# Patient Record
Sex: FEMALE | Race: WHITE | NOT HISPANIC OR LATINO | Employment: FULL TIME | URBAN - METROPOLITAN AREA
[De-identification: names, ages, dates, MRNs, and addresses within clinical notes are randomized per-mention and may not be internally consistent; named-entity substitution may affect disease eponyms.]

---

## 2017-08-08 ENCOUNTER — ALLSCRIPTS OFFICE VISIT (OUTPATIENT)
Dept: OTHER | Facility: OTHER | Age: 27
End: 2017-08-08

## 2017-08-15 ENCOUNTER — ALLSCRIPTS OFFICE VISIT (OUTPATIENT)
Dept: OTHER | Facility: OTHER | Age: 27
End: 2017-08-15

## 2017-08-15 LAB
BILIRUB UR QL STRIP: NORMAL
CLARITY UR: NORMAL
COLOR UR: YELLOW
GLUCOSE (HISTORICAL): NORMAL
HGB UR QL STRIP.AUTO: NORMAL
KETONES UR STRIP-MCNC: NORMAL MG/DL
LEUKOCYTE ESTERASE UR QL STRIP: 500
NITRITE UR QL STRIP: NORMAL
PH UR STRIP.AUTO: 7 [PH]
PROT UR STRIP-MCNC: NORMAL MG/DL
SP GR UR STRIP.AUTO: 1
UROBILINOGEN UR QL STRIP.AUTO: NORMAL

## 2017-08-17 LAB
CULTURE RESULT (HISTORICAL): ABNORMAL
MISCELLANEOUS LAB TEST RESULT (HISTORICAL): ABNORMAL
SUSCEP. REFLEX (HISTORICAL): ABNORMAL

## 2018-01-13 NOTE — PROGRESS NOTES
Assessment    1  Encounter for preventive health examination (V70 0) (Z00 00)    Plan  Fear of flying    · LORazepam 0 5 MG Oral Tablet; TAKE ONE TABLET BY MOUTH EVERY 8  HOURS AS NEEDED  Health Maintenance    · (1) COMPREHENSIVE METABOLIC PANEL; Status:Active; Requested for:[id number];    · Follow-up visit in 1 year Evaluation and Treatment  Follow-up  Status: Hold For -  Scheduling  Requested for: 25QIH1489   · Always use a seat belt and shoulder strap when riding or driving a motor vehicle ;  Status:Complete;   Done: 26QQP2778 09:01AM   · Use a sun block product with an SPF of 15 or more ; Status:Complete;   Done:  56VUD6543 09:01AM   · We recommend that you follow the "Mediterranean diet "; Status:Complete;   Done:  76UUJ4036 09:01AM  Health Maintenance, Paresthesia of both feet    · (1) CBC/PLT/DIFF; Status:Active; Requested for:93Qwj9432;    · (1) TSH; Status:Active; Requested for:41Zdy5085;   Screening for hyperlipidemia    · (1) LIPID PANEL, FASTING; Status:Active; Requested for:78Fhq2895;     Discussion/Summary  health maintenance visit Currently, she eats a healthy diet  cervical cancer screening is needed every three years Breast cancer screening: breast cancer screening is not indicated  Colorectal cancer screening: colorectal cancer screening is not indicated  Osteoporosis screening: bone mineral density testing is not indicated  Screening lab work includes hemoglobin, glucose, lipid profile and thyroid function testing  The immunizations are up to date  Advice and education were given regarding aerobic exercise, sunscreen use and seat belt use  Chief Complaint  Patient is here today for her annual physical exam, L  Whittaker/LPN      History of Present Illness  HM, Adult Female: The patient is being seen for a health maintenance evaluation  The last health maintenance visit was 12 month(s) ago  Social History: Household members include spouse  She is    Work status: working full time and occupation: Teacher  The patient has never smoked cigarettes  She reports rare alcohol use  She has never used illicit drugs  General Health: The patient's health since the last visit is described as good  She has regular dental visits  She denies vision problems  She denies hearing loss  Immunizations status: up to date  Lifestyle:  She consumes a diverse and healthy diet  She does not have any weight concerns  She exercises regularly  She does not use tobacco  She denies drug use  Reproductive health: the patient is premenopausal    Screening: Cervical cancer screening includes a pap smear performed last year  Breast cancer screening includes no previous mammogram  She hasn't been previously screened for colorectal cancer  Metabolic screening includes lipid profile performed within the past five years, glucose screening performed last year and thyroid function test performed last year  Cardiovascular risk factors: no hypertension, no diabetes, no high LDL cholesterol, no low HDL cholesterol, no stress, no obesity, no tobacco use, no illicit drug use and no sedentary lifestyle  General health risks: no previous breast cancer, no previous colon polyps, no inflammatory bowel disease, no osteoporosis risk factors, no asbestos exposure and no radiation exposure  Safety elements used: seat belt, smoke detector and carbon monoxide detector  Review of Systems    Constitutional: No fever, no chills, feels well, no tiredness, no recent weight gain or weight loss  Eyes: No complaints of eye pain, no red eyes, no eyesight problems, no discharge, no dry eyes, no itching of eyes  ENT: no complaints of earache, no loss of hearing, no nose bleeds, no nasal discharge, no sore throat, no hoarseness  Cardiovascular: No complaints of slow heart rate, no fast heart rate, no chest pain, no palpitations, no leg claudication, no lower extremity edema     Respiratory: No complaints of shortness of breath, no wheezing, no cough, no SOB on exertion, no orthopnea, no PND  Gastrointestinal: No complaints of abdominal pain, no constipation, no nausea or vomiting, no diarrhea, no bloody stools  Genitourinary: No complaints of dysuria, no incontinence, no pelvic pain, no dysmenorrhea, no vaginal discharge or bleeding  Musculoskeletal: No complaints of arthralgias, no myalgias, no joint swelling or stiffness, no limb pain or swelling  Integumentary: No complaints of skin rash or lesions, no itching, no skin wounds, no breast pain or lump  Neurological: No complaints of headache, no confusion, no convulsions, no numbness, no dizziness or fainting, no tingling, no limb weakness, no difficulty walking  Psychiatric: Not suicidal, no sleep disturbance, no anxiety or depression, no change in personality, no emotional problems  Endocrine: No complaints of proptosis, no hot flashes, no muscle weakness, no deepening of the voice, no feelings of weakness  Hematologic/Lymphatic: No complaints of swollen glands, no swollen glands in the neck, does not bleed easily, does not bruise easily  Active Problems    1  Acute pharyngitis (462) (J02 9)   2  Blood in the stool (578 1) (K92 1)   3  Encounter for vitamin deficiency screening (V77 99) (Z13 21)   4  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)   5  Paresthesia of both feet (782 0) (R20 2)   6   Thyroid disorder screening (V77 0) (Z13 29)    Past Medical History    · History of Goiter, simple (240 0) (E04 0)   · History of No pertinent past surgical history    Surgical History    · Denied: History Of Prior Surgery    Family History  Mother    · Family history of    · Family history of malignant neoplasm of breast (V16 3) (Z80 3)  Father    · No pertinent family history    Social History    · Alcohol use   · Always uses seat belt   · Caffeine use (V49 89) (F15 90)   · Employed   · Minimum alcohol consumption   · Never smoked   · No drug use   · Primary language is [place] · Single    Current Meds   1  Lo Loestrin Fe 1 MG-10 MCG / 10 MCG Oral Tablet; Therapy: (Recorded:71Tyk6701) to Recorded    Allergies    1  Amoxicillin TABS    2  Soy    Vitals   Recorded: 17Aml5645 08:16AM   Temperature 99 F, Temporal   Heart Rate 74, R Radial   Pulse Quality Normal, R Radial   Respiration Quality Normal   Respiration 16   Systolic 198, LUE, Sitting   Diastolic 70, LUE, Sitting   Height 5 ft 8 in   Weight 138 lb    BMI Calculated 20 98   BSA Calculated 1 75   O2 Saturation 98     Physical Exam    Constitutional   General appearance: No acute distress, well appearing and well nourished  Eyes   Conjunctiva and lids: No swelling, erythema or discharge  Pupils and irises: Equal, round, reactive to light  Ophthalmoscopic examination: Normal fundi and optic discs  Ears, Nose, Mouth, and Throat   External inspection of ears and nose: Normal     Otoscopic examination: Tympanic membranes translucent with normal light reflex  Canals patent without erythema  Hearing: Normal     Nasal mucosa, septum, and turbinates: Normal without edema or erythema  Lips, teeth, and gums: Normal, good dentition  Oropharynx: Normal with no erythema, edema, exudate or lesions  Neck   Neck: Supple, symmetric, trachea midline, no masses  Thyroid: Normal, no thyromegaly  Pulmonary   Respiratory effort: No increased work of breathing or signs of respiratory distress  Auscultation of lungs: Clear to auscultation  Cardiovascular   Auscultation of heart: Normal rate and rhythm, normal S1 and S2, no murmurs  Carotid pulses: 2+ bilaterally  Abdominal aorta: Normal     Examination of extremities for edema and/or varicosities: Normal     Abdomen   Abdomen: Non-tender, no masses  Liver and spleen: No hepatomegaly or splenomegaly  Lymphatic   Palpation of lymph nodes in neck: No lymphadenopathy      Musculoskeletal   Gait and station: Normal     Digits and nails: Normal without clubbing or cyanosis  Joints, bones, and muscles: Normal     Range of motion: Normal     Stability: Normal     Muscle strength/tone: Normal     Skin   Skin and subcutaneous tissue: Normal without rashes or lesions  Palpation of skin and subcutaneous tissue: Normal turgor  Neurologic   Cranial nerves: Cranial nerves II-XII intact  Reflexes: 2+ and symmetric  Sensation: No sensory loss  Psychiatric   Judgment and insight: Normal     Orientation to person, place, and time: Normal     Recent and remote memory: Intact  Mood and affect: Normal        Results/Data  PHQ-2 Adult Depression Screening 34Ddn5990 08:19AM User, s     Test Name Result Flag Reference   PHQ-2 Adult Depression Score 0     Over the last two weeks, how often have you been bothered by any of the following problems?   Little interest or pleasure in doing things: Not at all - 0  Feeling down, depressed, or hopeless: Not at all - 0   PHQ-2 Adult Depression Screening Negative         Signatures   Electronically signed by : Reyna Hunter MD; Aug 15 2017  9:05AM EST                       (Author)

## 2018-01-14 VITALS
OXYGEN SATURATION: 98 % | HEIGHT: 68 IN | HEART RATE: 74 BPM | RESPIRATION RATE: 16 BRPM | TEMPERATURE: 99 F | DIASTOLIC BLOOD PRESSURE: 70 MMHG | WEIGHT: 138 LBS | SYSTOLIC BLOOD PRESSURE: 108 MMHG | BODY MASS INDEX: 20.92 KG/M2

## 2018-01-14 VITALS
SYSTOLIC BLOOD PRESSURE: 110 MMHG | RESPIRATION RATE: 16 BRPM | WEIGHT: 139 LBS | TEMPERATURE: 99.2 F | BODY MASS INDEX: 21.07 KG/M2 | HEIGHT: 68 IN | HEART RATE: 88 BPM | DIASTOLIC BLOOD PRESSURE: 80 MMHG

## 2018-08-16 ENCOUNTER — OFFICE VISIT (OUTPATIENT)
Dept: FAMILY MEDICINE CLINIC | Facility: CLINIC | Age: 28
End: 2018-08-16
Payer: COMMERCIAL

## 2018-08-16 VITALS
HEART RATE: 83 BPM | HEIGHT: 68 IN | RESPIRATION RATE: 16 BRPM | DIASTOLIC BLOOD PRESSURE: 70 MMHG | OXYGEN SATURATION: 98 % | TEMPERATURE: 98.2 F | BODY MASS INDEX: 20.49 KG/M2 | SYSTOLIC BLOOD PRESSURE: 120 MMHG | WEIGHT: 135.2 LBS

## 2018-08-16 DIAGNOSIS — Z00.00 HEALTH MAINTENANCE EXAMINATION: ICD-10-CM

## 2018-08-16 DIAGNOSIS — E03.8 OTHER SPECIFIED HYPOTHYROIDISM: Primary | ICD-10-CM

## 2018-08-16 PROCEDURE — 99395 PREV VISIT EST AGE 18-39: CPT | Performed by: FAMILY MEDICINE

## 2018-08-16 RX ORDER — LEVOTHYROXINE SODIUM 75 MCG
TABLET ORAL
COMMUNITY
Start: 2018-08-15

## 2018-08-16 NOTE — PROGRESS NOTES
Assessment/Plan:  Health maintenance exam             TSH in 4 months  Mediterranean diet  Prenatal MultiVites  Ob visit next week  Continue regular exercise  Seatbelts  Smoke detectors  CO detectors  Follow-up 1 year  Subjective:     Patient ID: Jessica Guzmán is a 34 y o  female  This is a 42-year-old female who presents for a health maintenance exam   The patient is a history of hypothyroidism  In addition 7 weeks gesstation twins  Review of Systems   Constitutional: Negative  HENT: Negative  Eyes: Negative  Respiratory: Negative  Cardiovascular: Negative  Gastrointestinal: Negative  Musculoskeletal: Negative  Neurological: Negative  Objective:     Physical Exam   Constitutional: She is oriented to person, place, and time  She appears well-developed and well-nourished  HENT:   Head: Normocephalic and atraumatic  Right Ear: External ear normal    Left Ear: External ear normal    Nose: Nose normal    Mouth/Throat: No oropharyngeal exudate  Eyes: Conjunctivae and EOM are normal  Pupils are equal, round, and reactive to light  Right eye exhibits no discharge  Left eye exhibits no discharge  No scleral icterus  Neck: Normal range of motion  Neck supple  No JVD present  No tracheal deviation present  No thyromegaly present  Cardiovascular: Normal rate, regular rhythm and normal heart sounds  Exam reveals no gallop and no friction rub  No murmur heard  Pulmonary/Chest: Effort normal and breath sounds normal  No stridor  No respiratory distress  She has no wheezes  She has no rales  She exhibits no tenderness  Abdominal: Soft  Bowel sounds are normal  She exhibits no distension and no mass  There is no tenderness  There is no rebound and no guarding  Musculoskeletal: Normal range of motion  She exhibits no edema, tenderness or deformity  Lymphadenopathy:     She has no cervical adenopathy     Neurological: She is alert and oriented to person, place, and time  No cranial nerve deficit  Psychiatric: She has a normal mood and affect   Her behavior is normal  Judgment and thought content normal

## 2019-02-19 ENCOUNTER — TELEPHONE (OUTPATIENT)
Dept: FAMILY MEDICINE CLINIC | Facility: CLINIC | Age: 29
End: 2019-02-19

## 2019-03-15 ENCOUNTER — TELEPHONE (OUTPATIENT)
Dept: FAMILY MEDICINE CLINIC | Facility: CLINIC | Age: 29
End: 2019-03-15

## 2019-03-15 NOTE — TELEPHONE ENCOUNTER
Pt was discharged after having twins  Called number, could not leave a message  will call back later

## 2019-03-19 NOTE — TELEPHONE ENCOUNTER
Called to leave message for pt to congratulate her on twins  Could not leave message   Will call back

## 2019-04-18 ENCOUNTER — OFFICE VISIT (OUTPATIENT)
Dept: FAMILY MEDICINE CLINIC | Facility: CLINIC | Age: 29
End: 2019-04-18
Payer: COMMERCIAL

## 2019-04-18 VITALS
HEART RATE: 108 BPM | WEIGHT: 132.6 LBS | BODY MASS INDEX: 20.1 KG/M2 | HEIGHT: 68 IN | OXYGEN SATURATION: 98 % | RESPIRATION RATE: 14 BRPM | TEMPERATURE: 99.7 F | DIASTOLIC BLOOD PRESSURE: 60 MMHG | SYSTOLIC BLOOD PRESSURE: 114 MMHG

## 2019-04-18 DIAGNOSIS — N61.0 ACUTE MASTITIS OF RIGHT BREAST: ICD-10-CM

## 2019-04-18 PROCEDURE — 3008F BODY MASS INDEX DOCD: CPT | Performed by: FAMILY MEDICINE

## 2019-04-18 PROCEDURE — 99213 OFFICE O/P EST LOW 20 MIN: CPT | Performed by: FAMILY MEDICINE

## 2019-04-18 PROCEDURE — 1036F TOBACCO NON-USER: CPT | Performed by: FAMILY MEDICINE

## 2019-04-18 RX ORDER — CLINDAMYCIN HYDROCHLORIDE 150 MG/1
150 CAPSULE ORAL 3 TIMES DAILY
Qty: 30 CAPSULE | Refills: 0 | Status: SHIPPED | OUTPATIENT
Start: 2019-04-18 | End: 2019-04-28

## 2019-08-15 ENCOUNTER — APPOINTMENT (OUTPATIENT)
Dept: RADIOLOGY | Facility: CLINIC | Age: 29
End: 2019-08-15
Payer: COMMERCIAL

## 2019-08-15 ENCOUNTER — OFFICE VISIT (OUTPATIENT)
Dept: URGENT CARE | Facility: CLINIC | Age: 29
End: 2019-08-15
Payer: COMMERCIAL

## 2019-08-15 VITALS
BODY MASS INDEX: 20.92 KG/M2 | OXYGEN SATURATION: 100 % | HEIGHT: 68 IN | SYSTOLIC BLOOD PRESSURE: 100 MMHG | HEART RATE: 72 BPM | WEIGHT: 138 LBS | DIASTOLIC BLOOD PRESSURE: 62 MMHG | RESPIRATION RATE: 16 BRPM | TEMPERATURE: 98.8 F

## 2019-08-15 DIAGNOSIS — M25.572 PAIN IN JOINT, ANKLE AND FOOT, LEFT: ICD-10-CM

## 2019-08-15 DIAGNOSIS — M25.572 PAIN IN JOINT, ANKLE AND FOOT, LEFT: Primary | ICD-10-CM

## 2019-08-15 PROCEDURE — 99213 OFFICE O/P EST LOW 20 MIN: CPT | Performed by: PHYSICIAN ASSISTANT

## 2019-08-15 PROCEDURE — 73630 X-RAY EXAM OF FOOT: CPT

## 2019-08-15 PROCEDURE — 73610 X-RAY EXAM OF ANKLE: CPT

## 2019-08-15 NOTE — PROGRESS NOTES
[facility]        NAME: Jose Narvaez is a 34 y o  female  : 1990    MRN: [id number]  DATE: 2019  TIME: 2:47 PM    Assessment and Plan   Pain in joint, ankle and foot, left [M25 572]  1  Pain in joint, ankle and foot, left  XR foot 3+ vw left    XR ankle 3+ vw left    Ambulatory referral to Orthopedic Surgery         Patient Instructions     Discussed condition with pt  XR of left foot and ankle does not show definitive sign of acute fracture  I rec RICE, limited weightbearing, avoid high impact activity, and will refer her to ortho for consult and further eval given the chronicity of her symptoms in relation to injury  Follow up with PCP in 3-5 days  Proceed to  ER if symptoms worsen  Chief Complaint     Chief Complaint   Patient presents with    Ankle Injury     Pt here for left ankle injury pt states  from May  pt states she twisted her  left  ankle  and now is having top  foot pain  and up into shin pain  7/10 when walking down stairs  when pressure is applied  Pt used ice and an  Ace wrap  History of Present Illness       Pt presents with what she reports are issues with her left foot and ankle that stem from an injury that occurred back in May 2019  She reports she injured her left ankle in a twisting mechanism  Tried treating conservatively over time with ice, ACE wrap but still has pain located in her dorsal left foot which radiates proximally into the distal shin  She denies any new injury or exacerbating incident related to this area since initial injury occurred  Review of Systems   Review of Systems   Constitutional: Negative  Respiratory: Negative  Cardiovascular: Negative  Gastrointestinal: Negative  Genitourinary: Negative      Musculoskeletal:        Persistent left foot/ankle/shin pain S/P remote injury          Current Medications       Current Outpatient Medications:     Multiple Vitamins-Minerals (MULTI COMPLETE PO), Take by mouth, Disp: , Rfl:     Prenatal MV-Min-Fe Fum-FA-DHA (PRENATAL+DHA PO), Take by mouth With Iron, Disp: , Rfl:     SYNTHROID 75 MCG tablet, , Disp: , Rfl:     Current Allergies     Allergies as of 08/15/2019 - Reviewed 08/15/2019   Allergen Reaction Noted    Amoxicillin Vomiting 02/09/2015    Isoflavones Anaphylaxis 02/09/2015            The following portions of the patient's history were reviewed and updated as appropriate: allergies, current medications, past family history, past medical history, past social history, past surgical history and problem list      Past Medical History:   Diagnosis Date    Patient denies medical problems        Past Surgical History:   Procedure Laterality Date    NO PAST SURGERIES      NO PAST SURGERIES      VAGINAL DELIVERY  03/11/2019    TWINS       Family History   Problem Relation Age of Onset    Breast cancer Mother     Substance Abuse Father         HISTORY    Mental illness Neg Hx          Medications have been verified  Objective   /62 (BP Location: Right arm, Patient Position: Sitting, Cuff Size: Standard)   Pulse 72   Temp 98 8 °F (37 1 °C) (Tympanic)   Resp 16   Ht 5' 7 5" (1 715 m)   Wt 62 6 kg (138 lb)   SpO2 100%   BMI 21 29 kg/m²        Physical Exam     Physical Exam   Constitutional: She is oriented to person, place, and time  She appears well-developed and well-nourished  No distress  Musculoskeletal:   TTP left dorsal foot over the metatarsals, most significantly over the 3rd metatarsal, worsened with PROM especially in passive plantar flexion  No significant visible STS or ecchymosis noted  Ankle ROM intact without difficulty with no significant swelling  Drawer sign negative  Neurological: She is alert and oriented to person, place, and time  Psychiatric: She has a normal mood and affect  Vitals reviewed

## 2019-08-16 ENCOUNTER — OFFICE VISIT (OUTPATIENT)
Dept: OBGYN CLINIC | Facility: CLINIC | Age: 29
End: 2019-08-16
Payer: COMMERCIAL

## 2019-08-16 ENCOUNTER — TELEPHONE (OUTPATIENT)
Dept: OBGYN CLINIC | Facility: HOSPITAL | Age: 29
End: 2019-08-16

## 2019-08-16 VITALS
HEART RATE: 71 BPM | WEIGHT: 133 LBS | DIASTOLIC BLOOD PRESSURE: 71 MMHG | SYSTOLIC BLOOD PRESSURE: 113 MMHG | HEIGHT: 68 IN | BODY MASS INDEX: 20.16 KG/M2

## 2019-08-16 DIAGNOSIS — M79.672 LEFT FOOT PAIN: ICD-10-CM

## 2019-08-16 PROCEDURE — 99203 OFFICE O/P NEW LOW 30 MIN: CPT | Performed by: ORTHOPAEDIC SURGERY

## 2019-08-16 NOTE — TELEPHONE ENCOUNTER
Caller: [facility]  Call Back Number: [phone]  Provider: Dr Chano Aquino called and is asking for prio authorization on patients MRI      Please advise, thankyou

## 2019-08-16 NOTE — TELEPHONE ENCOUNTER
We were made aware this morning that patient was going to schedule at [facility]  We are working on the authorization

## 2019-08-16 NOTE — PROGRESS NOTES
Assessment/Plan:  1  Left foot pain  Ambulatory referral to Orthopedic Surgery    MRI foot/forefoot toes left wo contrast     [address] [facility] has pinpoint tenderness over her third metatarsal on her left foot concerning for possible stress fracture  Her x-ray in the office today did not show an injury at that location  I would like an MRI of her left foot further evaluation to rule out a stress injury  Radiology did comment on several small possible avulsion injuries over her calcaneus laterally and her talus medially  She is not tender in either of these locations on exam   She was advised not to run and where proper footwear for the next week until she gets the MRI  She will follow up after the MRI is complete  She was instructed to bring the disc on follow-up    Subjective:   Carlos Price is a 34 y o  female who presents to the office for evaluation for left-sided foot pain  She states that the pain began back in May of this year  She did suffer an ankle sprain around that time  She is also an avid runner  She states that she has noticed increasing pain over the dorsum of her left foot  The pain worsens with walking and running and improves with rest   She states that she recently had twins and was not running for 9-10 months during the pregnancy and shortly thereafter  She states that she went from no running to running 5-6 miles a day as she was starting to get back into running over the past few months  She denies any history of foot injury or stress fracture in the past   She denies any vitamin deficiency or diet restrictions  Review of Systems   Constitutional: Negative for chills, fever and unexpected weight change  HENT: Negative for hearing loss, nosebleeds and sore throat  Eyes: Negative for pain, redness and visual disturbance  Respiratory: Negative for cough, shortness of breath and wheezing  Cardiovascular: Negative for chest pain, palpitations and leg swelling     Gastrointestinal: Negative for abdominal pain, nausea and vomiting  Endocrine: Negative for polydipsia and polyuria  Genitourinary: Negative for dysuria and hematuria  Musculoskeletal:        See HPI   Skin: Negative for rash and wound  Neurological: Negative for dizziness, numbness and headaches  Psychiatric/Behavioral: Negative for decreased concentration and suicidal ideas  The patient is not nervous/anxious  Past Medical History:   Diagnosis Date    Patient denies medical problems        Past Surgical History:   Procedure Laterality Date    NO PAST SURGERIES      NO PAST SURGERIES      VAGINAL DELIVERY  03/11/2019    TWINS       Family History   Problem Relation Age of Onset    Breast cancer Mother     Substance Abuse Father         HISTORY    Mental illness Neg Hx        Social History     Occupational History    Not on file   Tobacco Use    Smoking status: Never Smoker    Smokeless tobacco: Never Used   Substance and Sexual Activity    Alcohol use: Yes     Frequency: 2-3 times a week     Drinks per session: 1 or 2     Comment: minimal    Drug use: No    Sexual activity: Not on file         Current Outpatient Medications:     Multiple Vitamins-Minerals (MULTI COMPLETE PO), Take by mouth, Disp: , Rfl:     Prenatal MV-Min-Fe Fum-FA-DHA (PRENATAL+DHA PO), Take by mouth With Iron, Disp: , Rfl:     SYNTHROID 75 MCG tablet, , Disp: , Rfl:     Allergies   Allergen Reactions    Amoxicillin Vomiting    Isoflavones Anaphylaxis     Annotation - [id number]: SOY MILK - SWELLING OF LIP AND TONGUE       Objective:  Vitals:    08/16/19 1103   BP: 113/71   Pulse: 71       Left Ankle Exam     Tenderness   Left ankle tenderness location: Slight discomfort to peroneal tendon       Range of Motion   Dorsiflexion: normal   Plantar flexion: normal   Eversion: normal   Inversion: normal     Muscle Strength   Dorsiflexion:  5/5   Plantar flexion:  5/5   Anterior tibial:  5/5   Posterior tibial:  5/5  Gastrocsoleus: 5/5  Peroneal muscle:  5/5    Other   Erythema: absent  Sensation: normal  Pulse: present          Strength/Myotome Testing     Left Ankle/Foot   Dorsiflexion: 5  Plantar flexion: 5      Physical Exam   Constitutional: She is oriented to person, place, and time  She appears well-developed and well-nourished  HENT:   Head: Normocephalic and atraumatic  Eyes: Pupils are equal, round, and reactive to light  Conjunctivae are normal    Neck: Normal range of motion  Neck supple  Cardiovascular: Normal rate and intact distal pulses  Pulmonary/Chest: Effort normal  No respiratory distress  Musculoskeletal:        Left foot: There is tenderness and bony tenderness  There is no swelling  Feet:    As noted in HPI   Neurological: She is alert and oriented to person, place, and time  Skin: Skin is warm and dry  Psychiatric: She has a normal mood and affect  Her behavior is normal    Vitals reviewed  I have personally reviewed pertinent films in PACS and my interpretation is as follows: Three-view x-ray of the left foot dated 8/14/2019 demonstrates no evidence of acute fracture  Three-view x-rays of the right ankle dated 8/14/2019 demonstrates no obvious fracture    There is slight bony abnormality over the medial aspect of the calcaneus which is nontender on exam

## 2019-08-28 ENCOUNTER — OFFICE VISIT (OUTPATIENT)
Dept: OBGYN CLINIC | Facility: CLINIC | Age: 29
End: 2019-08-28
Payer: COMMERCIAL

## 2019-08-28 VITALS
HEIGHT: 68 IN | SYSTOLIC BLOOD PRESSURE: 108 MMHG | DIASTOLIC BLOOD PRESSURE: 74 MMHG | HEART RATE: 74 BPM | WEIGHT: 133 LBS | BODY MASS INDEX: 20.16 KG/M2

## 2019-08-28 DIAGNOSIS — M79.672 LEFT FOOT PAIN: Primary | ICD-10-CM

## 2019-08-28 PROCEDURE — 99213 OFFICE O/P EST LOW 20 MIN: CPT | Performed by: ORTHOPAEDIC SURGERY

## 2019-08-28 NOTE — PROGRESS NOTES
Assessment/Plan:  1  Left foot pain       Sofia appears to have a normal MRI of her left foot with some mild edema in the soft tissue  There is no abnormality in her bone suggestive of stress fracture  She can resume running as tolerated  I advised her to run in proper footwear and gradually increase her distance as to avoid further injury  She will follow up as needed  Subjective:   Ying Duran is a 34 y o  female who presents for follow-up for left-sided foot pain  She was having increased pain in her left foot with running and with there was clinical concern for possible stress injury  We obtain an MRI  She has been resting from running for the past 3 weeks and reports significant improvement in her pain  She denies any new injury  She even ran yesterday without pain  Review of Systems   Constitutional: Negative for chills, fever and unexpected weight change  HENT: Negative for hearing loss, nosebleeds and sore throat  Eyes: Negative for pain, redness and visual disturbance  Respiratory: Negative for cough, shortness of breath and wheezing  Cardiovascular: Negative for chest pain, palpitations and leg swelling  Gastrointestinal: Negative for abdominal pain, nausea and vomiting  Endocrine: Negative for polydipsia and polyuria  Genitourinary: Negative for dysuria and hematuria  Musculoskeletal:        See HPI   Skin: Negative for rash and wound  Neurological: Negative for dizziness, numbness and headaches  Psychiatric/Behavioral: Negative for decreased concentration and suicidal ideas  The patient is not nervous/anxious            Past Medical History:   Diagnosis Date    Patient denies medical problems        Past Surgical History:   Procedure Laterality Date    NO PAST SURGERIES      NO PAST SURGERIES      VAGINAL DELIVERY  03/11/2019    TWINS       Family History   Problem Relation Age of Onset    Breast cancer Mother     Substance Abuse Father         HISTORY    Mental illness Neg Hx        Social History     Occupational History    Not on file   Tobacco Use    Smoking status: Never Smoker    Smokeless tobacco: Never Used   Substance and Sexual Activity    Alcohol use: Yes     Frequency: 2-3 times a week     Drinks per session: 1 or 2     Comment: minimal    Drug use: No    Sexual activity: Not on file         Current Outpatient Medications:     Multiple Vitamins-Minerals (MULTI COMPLETE PO), Take by mouth, Disp: , Rfl:     Prenatal MV-Min-Fe Fum-FA-DHA (PRENATAL+DHA PO), Take by mouth With Iron, Disp: , Rfl:     SYNTHROID 75 MCG tablet, , Disp: , Rfl:     Allergies   Allergen Reactions    Amoxicillin Vomiting    Isoflavones Anaphylaxis     Annotation - [id number]: SOY MILK - SWELLING OF LIP AND TONGUE       Objective:  Vitals:    08/28/19 0938   BP: 108/74   Pulse: 74       Ortho Exam    Physical Exam   Constitutional: She is oriented to person, place, and time  She appears well-developed and well-nourished  HENT:   Head: Normocephalic and atraumatic  Eyes: Pupils are equal, round, and reactive to light  Conjunctivae are normal    Neck: Normal range of motion  Neck supple  Cardiovascular: Normal rate and intact distal pulses  Pulmonary/Chest: Effort normal  No respiratory distress  Musculoskeletal:        Left foot: There is no tenderness, no bony tenderness and no swelling  As noted in HPI   Neurological: She is alert and oriented to person, place, and time  Skin: Skin is warm and dry  Psychiatric: She has a normal mood and affect  Her behavior is normal    Vitals reviewed  I have personally reviewed pertinent films in PACS and my interpretation is as follows:  MRI of the left foot demonstrates no evidence of stress fracture    Mild soft tissue edema at the area of the second toe plantar plate